# Patient Record
Sex: MALE | Race: WHITE
[De-identification: names, ages, dates, MRNs, and addresses within clinical notes are randomized per-mention and may not be internally consistent; named-entity substitution may affect disease eponyms.]

---

## 2018-12-29 ENCOUNTER — HOSPITAL ENCOUNTER (EMERGENCY)
Dept: HOSPITAL 72 - EMR | Age: 23
Discharge: ANOTHER HOSPITAL | End: 2018-12-29
Payer: MEDICAID

## 2018-12-29 VITALS — SYSTOLIC BLOOD PRESSURE: 115 MMHG | DIASTOLIC BLOOD PRESSURE: 82 MMHG

## 2018-12-29 VITALS — WEIGHT: 175 LBS | HEIGHT: 78 IN | BODY MASS INDEX: 20.25 KG/M2

## 2018-12-29 VITALS — DIASTOLIC BLOOD PRESSURE: 70 MMHG | SYSTOLIC BLOOD PRESSURE: 112 MMHG

## 2018-12-29 DIAGNOSIS — R51: ICD-10-CM

## 2018-12-29 DIAGNOSIS — R44.0: Primary | ICD-10-CM

## 2018-12-29 DIAGNOSIS — R45.851: ICD-10-CM

## 2018-12-29 LAB
ADD MANUAL DIFF: NO
ALBUMIN SERPL-MCNC: 4.5 G/DL (ref 3.4–5)
ALBUMIN/GLOB SERPL: 1.4 {RATIO} (ref 1–2.7)
ALP SERPL-CCNC: 95 U/L (ref 46–116)
ALT SERPL-CCNC: 52 U/L (ref 12–78)
ANION GAP SERPL CALC-SCNC: 10 MMOL/L (ref 5–15)
APPEARANCE UR: CLEAR
APTT PPP: YELLOW S
AST SERPL-CCNC: 16 U/L (ref 15–37)
BASOPHILS NFR BLD AUTO: 1.6 % (ref 0–2)
BILIRUB SERPL-MCNC: 0.7 MG/DL (ref 0.2–1)
BUN SERPL-MCNC: 9 MG/DL (ref 7–18)
CALCIUM SERPL-MCNC: 9.4 MG/DL (ref 8.5–10.1)
CHLORIDE SERPL-SCNC: 104 MMOL/L (ref 98–107)
CK SERPL-CCNC: 56 U/L (ref 26–308)
CO2 SERPL-SCNC: 27 MMOL/L (ref 21–32)
CREAT SERPL-MCNC: 0.9 MG/DL (ref 0.55–1.3)
EOSINOPHIL NFR BLD AUTO: 0.7 % (ref 0–3)
ERYTHROCYTE [DISTWIDTH] IN BLOOD BY AUTOMATED COUNT: 10.4 % (ref 11.6–14.8)
GLOBULIN SER-MCNC: 3.2 G/DL
GLUCOSE UR STRIP-MCNC: NEGATIVE MG/DL
HCT VFR BLD CALC: 45.6 % (ref 42–52)
HGB BLD-MCNC: 15.6 G/DL (ref 14.2–18)
KETONES UR QL STRIP: NEGATIVE
LEUKOCYTE ESTERASE UR QL STRIP: (no result)
LYMPHOCYTES NFR BLD AUTO: 19.3 % (ref 20–45)
MCV RBC AUTO: 88 FL (ref 80–99)
MONOCYTES NFR BLD AUTO: 6.8 % (ref 1–10)
NEUTROPHILS NFR BLD AUTO: 71.7 % (ref 45–75)
NITRITE UR QL STRIP: NEGATIVE
PH UR STRIP: 6 [PH] (ref 4.5–8)
PLATELET # BLD: 177 K/UL (ref 150–450)
POTASSIUM SERPL-SCNC: 3.8 MMOL/L (ref 3.5–5.1)
PROT UR QL STRIP: (no result)
RBC # BLD AUTO: 5.16 M/UL (ref 4.7–6.1)
SODIUM SERPL-SCNC: 141 MMOL/L (ref 136–145)
SP GR UR STRIP: 1.01 (ref 1–1.03)
UROBILINOGEN UR-MCNC: 4 MG/DL (ref 0–1)
WBC # BLD AUTO: 6 K/UL (ref 4.8–10.8)

## 2018-12-29 PROCEDURE — 81003 URINALYSIS AUTO W/O SCOPE: CPT

## 2018-12-29 PROCEDURE — 82550 ASSAY OF CK (CPK): CPT

## 2018-12-29 PROCEDURE — 96375 TX/PRO/DX INJ NEW DRUG ADDON: CPT

## 2018-12-29 PROCEDURE — 84443 ASSAY THYROID STIM HORMONE: CPT

## 2018-12-29 PROCEDURE — 70450 CT HEAD/BRAIN W/O DYE: CPT

## 2018-12-29 PROCEDURE — 85651 RBC SED RATE NONAUTOMATED: CPT

## 2018-12-29 PROCEDURE — 80307 DRUG TEST PRSMV CHEM ANLYZR: CPT

## 2018-12-29 PROCEDURE — 85025 COMPLETE CBC W/AUTO DIFF WBC: CPT

## 2018-12-29 PROCEDURE — 96374 THER/PROPH/DIAG INJ IV PUSH: CPT

## 2018-12-29 PROCEDURE — 80329 ANALGESICS NON-OPIOID 1 OR 2: CPT

## 2018-12-29 PROCEDURE — 36415 COLL VENOUS BLD VENIPUNCTURE: CPT

## 2018-12-29 PROCEDURE — 99285 EMERGENCY DEPT VISIT HI MDM: CPT

## 2018-12-29 PROCEDURE — 80053 COMPREHEN METABOLIC PANEL: CPT

## 2018-12-29 RX ADMIN — SODIUM CHLORIDE SCH MLS/HR: 900 INJECTION, SOLUTION INTRAVENOUS at 19:10

## 2018-12-29 RX ADMIN — SODIUM CHLORIDE SCH MLS/HR: 900 INJECTION, SOLUTION INTRAVENOUS at 15:44

## 2018-12-29 NOTE — EMERGENCY ROOM REPORT
History of Present Illness


General


Chief Complaint:  Behavioral Complaint


Source:  Patient


 (Adalberto Blue MD)





Present Illness


HPI


Patient presents with posterior headache for 3-5 days.  He feels it from his 

head radiate down into his neck.  He denies any fevers or change in vision 

nausea vomiting diarrhea.  He's not taking any medication for the headache.  He 

states the pain is making him feel suicidal at this time.  His plan is to take 

an overdose.  He doesn't have access to medication at this time.  He's never 

been admitted medically.  He's been admitted 3 times psychiatrically in [facility].  Arrested once after discharge from there.  Recently states hearing 

voices.  Some question of whether schizophrenia.  Multiple meds in the past 

with some adverse reactions to some antipsychotics. 





Mom is been trying to get him evaluated for over a year no CT scan done of his 

head.  She has consulted with various people that this suggests that this may 

be encephalitis as opposed to schizophrenia.


 (Adalberto Blue MD)


Allergies:  


Coded Allergies:  


     No Known Allergies (Unverified , 12/29/18)





Patient History


Past Medical History:  see triage record


Social History:  Denies: smoking, alcohol use, drug use


Social History Narrative


recently moved from [place] with Mom


Reviewed Nursing Documentation:  PMH: Agreed; PSxH: Agreed (Adalberto Blue MD)





Nursing Documentation-PMH


Past Medical History:  No History, Except For


History Of Psychiatric Problem:  Yes


 (Adalberto Blue MD)





Physical Exam





Vital Signs








  Date Time  Temp Pulse Resp B/P (MAP) Pulse Ox O2 Delivery O2 Flow Rate FiO2


 


12/29/18 11:21 98.4 84 20 117/76 98 Room Air  








Sp02 EP Interpretation:  reviewed, normal


General Appearance:  well appearing, no apparent distress, GCS 15


Head:  normocephalic


Eyes:  bilateral eye normal inspection


ENT:  moist mucus membranes


Neck:  full range of motion, supple, no meningismus, no bony tend


Respiratory:  chest non-tender, lungs clear, normal breath sounds


Cardiovascular #1:  regular rate, rhythm


Cardiovascular #2:  2+ radial (R)


Gastrointestinal:  normal inspection, normal bowel sounds, non tender, no mass, 

non-distended


Musculoskeletal:  back normal, gait/station normal, normal range of motion


Neurologic:  alert, oriented x3, CNs III-XII nml as tested, motor strength/tone 

normal, DTRs symmetric, sensory intact, cerebellar normal, normal gait, speech 

normal


Psychiatric:  depressed affect


Suicide Risk Assessment:  


   Suicidal Ideation:  Yes


   Had intent to initiate attempt:  Yes


   Pt's plan for suicide attempt:  Yes


   Has means to complete attempt:  No


Reflexes:  2+ knee (R), 2+ knee (L)


Skin:  normal inspection, warm/dry


 (Adalberto Blue MD)





Medical Decision Making


Diagnostic Impression:  


 Primary Impression:  


 Suicidal ideation


 Additional Impressions:  


 Auditory hallucinations


 Headache


 Qualified Codes:  R51 - Headache


ER Course


Patient presents with suicidal ideation and plan but no means to completed at 

this time.  His history of possible schizophrenia in the past.  Differential 

includes exacerbation of schizophrenia, suicidal ideation, major depression, 

schizoaffective disorder amongst others.  Patient is afebrile now in the 

possibility of encephalitis is extremely low.  The fact he has headache he will 

be evaluated with CT of the head and labs. He'll be treated with Toradol, 

Reglan and Benadryl.  Also receive IV hydration.





CT negative.  Labs unremarkable.





The patient's headache is resolved.  Patient still feels suicidal.  Working to 

place the patient is a voluntary  suicidal ideation patient.





Medically clear @ 13:44





Signed out to Dr. Santo.





Laboratory Tests








Test


  12/29/18


12:08


 


White Blood Count


  6.0 K/UL


(4.8-10.8)


 


Red Blood Count


  5.16 M/UL


(4.70-6.10)


 


Hemoglobin


  15.6 G/DL


(14.2-18.0)


 


Hematocrit


  45.6 %


(42.0-52.0)


 


Mean Corpuscular Volume 88 FL (80-99)  


 


Mean Corpuscular Hemoglobin


  30.1 PG


(27.0-31.0)


 


Mean Corpuscular Hemoglobin


Concent 34.1 G/DL


(32.0-36.0)


 


Red Cell Distribution Width


  10.4 %


(11.6-14.8)  L


 


Platelet Count


  177 K/UL


(150-450)


 


Mean Platelet Volume


  7.3 FL


(6.5-10.1)


 


Neutrophils (%) (Auto)


  71.7 %


(45.0-75.0)


 


Lymphocytes (%) (Auto)


  19.3 %


(20.0-45.0)  L


 


Monocytes (%) (Auto)


  6.8 %


(1.0-10.0)


 


Eosinophils (%) (Auto)


  0.7 %


(0.0-3.0)


 


Basophils (%) (Auto)


  1.6 %


(0.0-2.0)


 


Erythrocyte Sedimentation Rate Pending  


 


Urine Color Yellow  


 


Urine Appearance Clear  


 


Urine pH 6 (4.5-8.0)  


 


Urine Specific Gravity


  1.015


(1.005-1.035)


 


Urine Protein


  1+ (NEGATIVE)


H


 


Urine Glucose (UA)


  Negative


(NEGATIVE)


 


Urine Ketones


  Negative


(NEGATIVE)


 


Urine Blood


  Negative


(NEGATIVE)


 


Urine Nitrite


  Negative


(NEGATIVE)


 


Urine Bilirubin


  Negative


(NEGATIVE)


 


Urine Urobilinogen


  4 MG/DL


(0.0-1.0)  H


 


Urine Leukocyte Esterase


  1+ (NEGATIVE)


H


 


Urine RBC


  0-2 /HPF (0 -


0)  H


 


Urine WBC


  2-4 /HPF (0 -


0)


 


Urine Squamous Epithelial


Cells Occasional


/LPF


 


Urine Calcium Oxalate Crystals


  Moderate /LPF


(NONE)


 


Urine Bacteria


  Few /HPF


(NONE)


 


Sodium Level


  141 MMOL/L


(136-145)


 


Potassium Level


  3.8 MMOL/L


(3.5-5.1)


 


Chloride Level


  104 MMOL/L


()


 


Carbon Dioxide Level


  27 MMOL/L


(21-32)


 


Anion Gap


  10 mmol/L


(5-15)


 


Blood Urea Nitrogen


  9 mg/dL (7-18)


 


 


Creatinine


  0.9 MG/DL


(0.55-1.30)


 


Estimate Glomerular


Filtration Rate > 60 mL/min


(>60)


 


Glucose Level


  107 MG/DL


()  H


 


Calcium Level


  9.4 MG/DL


(8.5-10.1)


 


Total Bilirubin


  0.7 MG/DL


(0.2-1.0)


 


Aspartate Amino Transferase


(AST) 16 U/L (15-37)


 


 


Alanine Aminotransferase (ALT)


  52 U/L (12-78)


 


 


Alkaline Phosphatase


  95 U/L


()


 


Total Creatine Kinase


  56 U/L


()


 


Total Protein


  7.7 G/DL


(6.4-8.2)


 


Albumin


  4.5 G/DL


(3.4-5.0)


 


Globulin 3.2 g/dL  


 


Albumin/Globulin Ratio 1.4 (1.0-2.7)  


 


Thyroid Stimulating Hormone


(TSH) 2.121 uiU/mL


(0.358-3.740)


 


Salicylates Level


  0.8 ug/mL


(2.8-20)  L


 


Urine Opiates Screen


  Negative


(NEGATIVE)


 


Acetaminophen Level


  < 2 MCG/ML


(10-30)  L


 


Urine Barbiturates Screen


  Negative


(NEGATIVE)


 


Phencyclidine (PCP) Screen


  Negative


(NEGATIVE)


 


Urine Amphetamines Screen


  Negative


(NEGATIVE)


 


Urine Benzodiazepines Screen


  Negative


(NEGATIVE)


 


Urine Cocaine Screen


  Negative


(NEGATIVE)


 


Urine Marijuana (THC) Screen


  Negative


(NEGATIVE)


 


Serum Alcohol < 3 mg/dL  








 (Adalberto Blue MD)


ER Course


to clarify, patient states his plan was to overdose on his medications.





patient cleared for voluntary psychiatric placement.  





[facility]


 (Ky Santo MD)





CT/MRI/US Diagnostic Results


CT/MRI/US Diagnostic Results :  


   Imaging Test Ordered:  head


   Impression


no lesions


 (Adalberto Blue MD)





Last Vital Signs








  Date Time  Temp Pulse Resp B/P (MAP) Pulse Ox O2 Delivery O2 Flow Rate FiO2


 


12/29/18 19:39 97.8 80 18 115/82 99 Room Air  








Status:  improved


 (Adalberto Blue MD)


Status:  improved


 (Ky Santo MD)


Disposition:  XFER TO PSYCH HOSP/UNIT


Condition:  Serious


Referrals:  


NON PHYSICIAN (PCP)











Adalberto Blue MD Dec 29, 2018 13:43


Ky Santo MD Dec 29, 2018 16:09

## 2018-12-29 NOTE — NUR
ED Nurse Note:





Report given to [facility]. 

Report was given to Deniz JULIEN at [facility] around 1730. 

IV removed without complication. patient's belonging list checked, patient only 
has clothings- 1 sweat pants, 1 black sweat shirt, 2 t shirts, 1 pair of brown 
shoes, patient took all of his belonings with him. 

a/o x4, ambulatory, transferred out of ed via ambulance [place].

## 2018-12-29 NOTE — NUR
ED Nurse Note:



patient brought in by mother, c/o "feeling dark," feels like hurting himself, 
no specific plan. patient is hearing dark voices. a/o x4, mother at bedside. 
ambulatory.

## 2019-08-13 ENCOUNTER — HOSPITAL ENCOUNTER (INPATIENT)
Dept: HOSPITAL 72 - EMR | Age: 24
LOS: 2 days | Discharge: HOME | DRG: 815 | End: 2019-08-15
Payer: COMMERCIAL

## 2019-08-13 VITALS — SYSTOLIC BLOOD PRESSURE: 144 MMHG | DIASTOLIC BLOOD PRESSURE: 90 MMHG

## 2019-08-13 VITALS — DIASTOLIC BLOOD PRESSURE: 85 MMHG | SYSTOLIC BLOOD PRESSURE: 129 MMHG

## 2019-08-13 VITALS — WEIGHT: 190.19 LBS | HEIGHT: 78 IN | BODY MASS INDEX: 22.01 KG/M2

## 2019-08-13 VITALS — DIASTOLIC BLOOD PRESSURE: 74 MMHG | SYSTOLIC BLOOD PRESSURE: 121 MMHG

## 2019-08-13 VITALS — DIASTOLIC BLOOD PRESSURE: 62 MMHG | SYSTOLIC BLOOD PRESSURE: 121 MMHG

## 2019-08-13 VITALS — SYSTOLIC BLOOD PRESSURE: 126 MMHG | DIASTOLIC BLOOD PRESSURE: 71 MMHG

## 2019-08-13 VITALS — SYSTOLIC BLOOD PRESSURE: 123 MMHG | DIASTOLIC BLOOD PRESSURE: 67 MMHG

## 2019-08-13 DIAGNOSIS — B94.8: ICD-10-CM

## 2019-08-13 DIAGNOSIS — F23: ICD-10-CM

## 2019-08-13 DIAGNOSIS — F41.9: ICD-10-CM

## 2019-08-13 DIAGNOSIS — M35.9: ICD-10-CM

## 2019-08-13 DIAGNOSIS — R41.0: ICD-10-CM

## 2019-08-13 DIAGNOSIS — D89.89: Primary | ICD-10-CM

## 2019-08-13 DIAGNOSIS — F32.9: ICD-10-CM

## 2019-08-13 DIAGNOSIS — R45.851: ICD-10-CM

## 2019-08-13 DIAGNOSIS — R44.0: ICD-10-CM

## 2019-08-13 LAB
ADD MANUAL DIFF: NO
ALBUMIN SERPL-MCNC: 4.4 G/DL (ref 3.4–5)
ALBUMIN/GLOB SERPL: 1.2 {RATIO} (ref 1–2.7)
ALP SERPL-CCNC: 77 U/L (ref 46–116)
ALT SERPL-CCNC: 29 U/L (ref 12–78)
ANION GAP SERPL CALC-SCNC: 10 MMOL/L (ref 5–15)
APPEARANCE UR: CLEAR
APTT PPP: (no result) S
AST SERPL-CCNC: 27 U/L (ref 15–37)
BASOPHILS NFR BLD AUTO: 1.6 % (ref 0–2)
BILIRUB SERPL-MCNC: 0.6 MG/DL (ref 0.2–1)
BUN SERPL-MCNC: 13 MG/DL (ref 7–18)
CALCIUM SERPL-MCNC: 9.3 MG/DL (ref 8.5–10.1)
CHLORIDE SERPL-SCNC: 107 MMOL/L (ref 98–107)
CK SERPL-CCNC: 123 U/L (ref 26–308)
CO2 SERPL-SCNC: 26 MMOL/L (ref 21–32)
CREAT SERPL-MCNC: 0.9 MG/DL (ref 0.55–1.3)
EOSINOPHIL NFR BLD AUTO: 2.3 % (ref 0–3)
ERYTHROCYTE [DISTWIDTH] IN BLOOD BY AUTOMATED COUNT: 12.2 % (ref 11.6–14.8)
GLOBULIN SER-MCNC: 3.8 G/DL
GLUCOSE UR STRIP-MCNC: NEGATIVE MG/DL
HCT VFR BLD CALC: 48.4 % (ref 42–52)
HGB BLD-MCNC: 16.3 G/DL (ref 14.2–18)
KETONES UR QL STRIP: NEGATIVE
LEUKOCYTE ESTERASE UR QL STRIP: NEGATIVE
LYMPHOCYTES NFR BLD AUTO: 38.9 % (ref 20–45)
MCV RBC AUTO: 90 FL (ref 80–99)
MONOCYTES NFR BLD AUTO: 12.8 % (ref 1–10)
NEUTROPHILS NFR BLD AUTO: 44.4 % (ref 45–75)
NITRITE UR QL STRIP: NEGATIVE
PH UR STRIP: 6 [PH] (ref 4.5–8)
PLATELET # BLD: 240 K/UL (ref 150–450)
POTASSIUM SERPL-SCNC: 3.6 MMOL/L (ref 3.5–5.1)
PROT UR QL STRIP: NEGATIVE
RBC # BLD AUTO: 5.39 M/UL (ref 4.7–6.1)
SODIUM SERPL-SCNC: 142 MMOL/L (ref 136–145)
SP GR UR STRIP: 1.01 (ref 1–1.03)
UROBILINOGEN UR-MCNC: NORMAL MG/DL (ref 0–1)
WBC # BLD AUTO: 8.5 K/UL (ref 4.8–10.8)

## 2019-08-13 PROCEDURE — 93005 ELECTROCARDIOGRAM TRACING: CPT

## 2019-08-13 PROCEDURE — 80329 ANALGESICS NON-OPIOID 1 OR 2: CPT

## 2019-08-13 PROCEDURE — 84484 ASSAY OF TROPONIN QUANT: CPT

## 2019-08-13 PROCEDURE — 82550 ASSAY OF CK (CPK): CPT

## 2019-08-13 PROCEDURE — 81003 URINALYSIS AUTO W/O SCOPE: CPT

## 2019-08-13 PROCEDURE — 80307 DRUG TEST PRSMV CHEM ANLYZR: CPT

## 2019-08-13 PROCEDURE — 96375 TX/PRO/DX INJ NEW DRUG ADDON: CPT

## 2019-08-13 PROCEDURE — 99285 EMERGENCY DEPT VISIT HI MDM: CPT

## 2019-08-13 PROCEDURE — 36415 COLL VENOUS BLD VENIPUNCTURE: CPT

## 2019-08-13 PROCEDURE — 80053 COMPREHEN METABOLIC PANEL: CPT

## 2019-08-13 PROCEDURE — 85025 COMPLETE CBC W/AUTO DIFF WBC: CPT

## 2019-08-13 PROCEDURE — 96374 THER/PROPH/DIAG INJ IV PUSH: CPT

## 2019-08-13 RX ADMIN — ZIPRASIDONE HYDROCHLORIDE SCH MG: 20 CAPSULE ORAL at 20:38

## 2019-08-13 NOTE — NUR
ED Nurse Note:



Pt came in from home with mother due to being anxious and paranoid. Mother 
stated pt has hx of PANDAS and Lyme disease which caused the behavior of "being 
paranoid and having horrible thoughts" but no SI/HI at this point. No complaint 
of pain. Tachycardic upon arrival, ERMD aware. Belongings are in locker #2. 
Will cont to monitor.

## 2019-08-13 NOTE — NUR
ED Nurse Note:



Flory from [facility] called and said pt will need to be 
evaluated by psychiatrist here at [facility]. CN made aware and pt will be admitted to 
[facility].

## 2019-08-13 NOTE — EMERGENCY ROOM REPORT
History of Present Illness


General


Chief Complaint:  Behavioral Complaint


Source:  Patient, Family Member





Present Illness


HPI


Patient presents stating that he is going to kill himself.  He feels out of 

control.  He tried taking Klonopin before coming in.  He is been hospitalized 

in the past for suicidal ideation and intent.  He was also drinking alcohol 

last night.  Denies other drug use at this time.  He is saying "I am afraid I 

will hurt other people and feel that I want to kill myself."  He is not able to 

vocalize a plan at this time.





Mom claims that these episodes are becoming more frequent in the last 2 to 3 

weeks.





According to his mom he has supplement deficiency, PANDA and Lyme disease.  

Also she claims that he has an overactive thymus gland.  Apparently he is due 

to get plasma exchange or IVIG soon.





Mom alleges that an EEG was performed that was abnormal but she is not sure 

what the results were.





No fevers, chills, sore throat, chest pain, palpitations, nausea, vomiting, 

diarrhea, dysuria, abdominal pain, shortness of breath, joint pain, rashes, 

visual changes, headache.





The patient was evaluated December 29 with this history:


Patient presents with posterior headache for 3-5 days.  He feels it from his 

head radiate down into his neck.  He denies any fevers or change in vision 

nausea vomiting diarrhea.  He's not taking any medication for the headache.  He 

states the pain is making him feel suicidal at this time.  His plan is to take 

an overdose.  He doesn't have access to medication at this time.  He's never 

been admitted medically.  He's been admitted 3 times psychiatrically in [facility].  Arrested once after discharge from there.  Recently states hearing 

voices.  Some question of whether schizophrenia.  Multiple meds in the past 

with some adverse reactions to some antipsychotics. 





Mom is been trying to get him evaluated for over a year no CT scan done of his 

head.  She has consulted with various people that this suggests that this may 

be encephalitis as opposed to schizophrenia.





He underwent voluntary psychiatric admission at that time.


Allergies:  


Coded Allergies:  


     No Known Allergies (Unverified , 12/29/18)





Patient History


Past Medical History:  see triage record


Social History:  Reports: smoking, alcohol use; Denies: drug use - in past


Social History Narrative


Lives with mom


Reviewed Nursing Documentation:  PMH: Agreed; PSxH: Agreed





Nursing Documentation-PMH


Past Medical History:  No History, Except For


Hx Neurological Problems:  Yes





Review of Systems


All Other Systems:  negative except mentioned in HPI





Physical Exam





Vital Signs








  Date Time  Temp Pulse Resp B/P (MAP) Pulse Ox O2 Delivery O2 Flow Rate FiO2


 


8/13/19 11:30 97.0 129 24 132/115 (121) 99 Room Air  








Sp02 EP Interpretation:  reviewed, normal


General Appearance:  alert, non-toxic, moderate distress


Head:  normocephalic, atraumatic


Eyes:  bilateral eye normal inspection, bilateral eye PERRL, bilateral eye EOMI


ENT:  moist mucus membranes


Neck:  supple


Respiratory:  lungs clear, normal breath sounds


Cardiovascular #1:  tachycardia


Cardiovascular #2:  2+ radial (L)


Gastrointestinal:  non tender, soft, decreased bowel sounds


Genitourinary:  no CVA tenderness


Musculoskeletal:  gait/station normal


Neurologic:  CNs III-XII nml as tested, motor strength/tone normal, DTRs 

symmetric, sensory intact, cerebellar normal, normal gait, speech normal, 

oriented - X2


Psychiatric:  anxious, other


Suicide Risk Assessment:  


   Suicidal Ideation:  Yes


   Had intent to initiate attempt:  Yes


Reflexes:  2+ knee (R), 2+ knee (L)


Skin:  no rash





Medical Decision Making


Medical:  Other


Behavioral:  Other


Reaction to Intervention:  No change


Restraint Reassesment


I, Adalberto Blue MD, have personally evaluated this patient. Laboratory tests 

have been reviewed and addressed accordingly.  The patient is deemed to present 

a danger to themselves and/or others.  This is based on the exam, history (

provided by patient) and observed or reported behavior.  


Attempts for non-invasive measures have been considered and/or attempted, 

however, have been futile. It is in the best interest of the nursing staff, the 

patient, and others involved in this patient's care that behavioral restraints 

be applied. 





Patient evaluation reveals the following:  patient states he cannot control his 

actions and is afraid he will harm himself or staff.


Diagnostic Impression:  


 Primary Impression:  


 Delirium


 Additional Impressions:  


 Suicidal ideation


 Autoimmune syndrome


 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


 Acute psychosis


ER Course


Patient presents with suicidal ideation and agitation.  Differential includes 

exacerbation of underlying psychiatric condition, electrolyte imbalance, toxic 

ingestion, partial complex seizures amongst others.  He is responding somewhat 

to coming influence his buddies states that he feels out of control needs to be 

restrained until were able to help him calm down.  Behavioral restraints have 

been ordered.  Patient will be evaluated with EKG chest x-ray and labs.  The 

patient will be given Benadryl and Ativan for sedation initially.  He will need 

psychiatric evaluation and most likely psychiatric admission.





EKG normal sinus rhythm with nonspecific ST-T wave changes rate 94.





More calm after IV meds.  D/C restraints.





Labs unremarkable.





Discussed with  who states the patient is due for plasma exchange or IV 

immunoglobulin with .  He wants the patient admitted to [facility] if at 

all possible.  Alternatively he requests that the patient be admitted to Dr. Alonzo Lawton.





After treatment here patient no longer suicidal or delusional.  Patient needs 

admission to the medical floor for the consideration of possible continued 

psychiatric treatment versus change or IV immunoglobulin treatment.





Consider partial complex seizure activity.





Presented to Dr. Lawton.





Presented to [facility].  





Signed out to Dr. Juarez.  (Patient already admitted here.)





Apparently [facility] declined transfer.





Laboratory Tests








Test


  8/13/19


11:40 8/13/19


12:24


 


White Blood Count


  8.5 K/UL


(4.8-10.8) 


 


 


Red Blood Count


  5.39 M/UL


(4.70-6.10) 


 


 


Hemoglobin


  16.3 G/DL


(14.2-18.0) 


 


 


Hematocrit


  48.4 %


(42.0-52.0) 


 


 


Mean Corpuscular Volume 90 FL (80-99)   


 


Mean Corpuscular Hemoglobin


  30.3 PG


(27.0-31.0) 


 


 


Mean Corpuscular Hemoglobin


Concent 33.7 G/DL


(32.0-36.0) 


 


 


Red Cell Distribution Width


  12.2 %


(11.6-14.8) 


 


 


Platelet Count


  240 K/UL


(150-450) 


 


 


Mean Platelet Volume


  6.7 FL


(6.5-10.1) 


 


 


Neutrophils (%) (Auto)


  44.4 %


(45.0-75.0)  L 


 


 


Lymphocytes (%) (Auto)


  38.9 %


(20.0-45.0) 


 


 


Monocytes (%) (Auto)


  12.8 %


(1.0-10.0)  H 


 


 


Eosinophils (%) (Auto)


  2.3 %


(0.0-3.0) 


 


 


Basophils (%) (Auto)


  1.6 %


(0.0-2.0) 


 


 


Sodium Level


  142 MMOL/L


(136-145) 


 


 


Potassium Level


  3.6 MMOL/L


(3.5-5.1) 


 


 


Chloride Level


  107 MMOL/L


() 


 


 


Carbon Dioxide Level


  26 MMOL/L


(21-32) 


 


 


Anion Gap


  10 mmol/L


(5-15) 


 


 


Blood Urea Nitrogen


  13 mg/dL


(7-18) 


 


 


Creatinine


  0.9 MG/DL


(0.55-1.30) 


 


 


Estimate Glomerular


Filtration Rate > 60 mL/min


(>60) 


 


 


Glucose Level


  90 MG/DL


() 


 


 


Calcium Level


  9.3 MG/DL


(8.5-10.1) 


 


 


Total Bilirubin


  0.6 MG/DL


(0.2-1.0) 


 


 


Aspartate Amino Transferase


(AST) 27 U/L (15-37)


  


 


 


Alanine Aminotransferase (ALT)


  29 U/L (12-78)


  


 


 


Alkaline Phosphatase


  77 U/L


() 


 


 


Total Creatine Kinase


  123 U/L


() 


 


 


Troponin I


  0.000 ng/mL


(0.000-0.056) 


 


 


Total Protein


  8.2 G/DL


(6.4-8.2) 


 


 


Albumin


  4.4 G/DL


(3.4-5.0) 


 


 


Globulin 3.8 g/dL   


 


Albumin/Globulin Ratio 1.2 (1.0-2.7)   


 


Salicylates Level


  0.6 ug/mL


(2.8-20)  L 


 


 


Acetaminophen Level


  < 2 MCG/ML


(10-30)  L 


 


 


Serum Alcohol < 3 mg/dL   


 


Urine Color  Pale yellow  


 


Urine Appearance  Clear  


 


Urine pH  6 (4.5-8.0)  


 


Urine Specific Gravity


  


  1.010


(1.005-1.035)


 


Urine Protein


  


  Negative


(NEGATIVE)


 


Urine Glucose (UA)


  


  Negative


(NEGATIVE)


 


Urine Ketones


  


  Negative


(NEGATIVE)


 


Urine Blood


  


  Negative


(NEGATIVE)


 


Urine Nitrite


  


  Negative


(NEGATIVE)


 


Urine Bilirubin


  


  Negative


(NEGATIVE)


 


Urine Urobilinogen


  


  Normal MG/DL


(0.0-1.0)


 


Urine Leukocyte Esterase


  


  Negative


(NEGATIVE)


 


Urine Opiates Screen


  


  Negative


(NEGATIVE)


 


Urine Barbiturates Screen


  


  Negative


(NEGATIVE)


 


Phencyclidine (PCP) Screen


  


  Negative


(NEGATIVE)


 


Urine Amphetamines Screen


  


  Negative


(NEGATIVE)


 


Urine Benzodiazepines Screen


  


  Negative


(NEGATIVE)


 


Urine Cocaine Screen


  


  Negative


(NEGATIVE)


 


Urine Marijuana (THC) Screen


  


  Negative


(NEGATIVE)








EKG Diagnostic Results


Rate:  normal


Rhythm:  NSR


ST Segments:  no acute changes





Rhythm Strip Diag. Results


EP Interpretation:  yes


Rhythm:  NSR, no PVC's, no ectopy





Last Vital Signs








  Date Time  Temp Pulse Resp B/P (MAP) Pulse Ox O2 Delivery O2 Flow Rate FiO2


 


8/13/19 18:03      Room Air  


 


8/13/19 17:50 98.0 80 17 118/74 100   








Status:  improved


Disposition:  ADMITTED AS INPATIENT


Condition:  Serious











Adalberto Blue MD Aug 13, 2019 11:52

## 2019-08-13 NOTE — NUR
ED Nurse Note:



Ativan 2mg given. barcode could not be scanned due to stiker covered the 
barcode. CN witnessed.

## 2019-08-13 NOTE — HISTORY AND PHYSICAL REPORT
DATE OF ADMISSION:  08/13/2019

CHIEF COMPLAINT AND REASON FOR HOSPITALIZATION:  The patient is admitted

with severe mental status changes.



HISTORY OF PRESENT ILLNESS:  The patient is a 24-year-old  male,

with a history of psychiatric problems and a prior diagnosis of PANDAS,

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

He has apparently had brain imaging in the past that is suggestive of a

autoimmune disease.  He came to the emergency room today with episodes of

depersonalization, hallucinations, sensation of being out of body, and

suicidal ideation.  It was felt by the emergency room physicians he

require acute hospitalization.  He could not control his emotions and

needs to be sedated and possibly restrained.  He apparently has told the

emergency room, steps in the past that he put a belt around his neck to

hang himself in the past.  He has had prior 9 psychiatric hospitalizations

in the last few years.  He has also had a history of apparently Lyme

disease being diagnosed and he states this may have been after a tick bite

at age of 9.  He is concerned that he has severe brain problem and he has

been seen by physicians at [facility] who recommended possible IVIg or

plasmapheresis for the autoimmune component associated with PANDAS.



ALLERGIES:  None known.



MEDICATIONS:  Geodon 60 mg daily; peptide, uncertain dose; Klonopin 0.5 mg

b.i.d.; Ativan 2 mg p.r.n.; zinc over-the-counter 1 daily; multivitamin

daily; doxycycline 400 mg daily; rifampin I believe 300 mg b.i.d.  Note,

the antibiotics apparently are to treat possible Lyme disease or

infectious component of his PANDAS.



HABITS:  He denies smoking, alcohol, or drugs.  His drug screen is

negative.



SOCIAL HISTORY:  He lives with his mother and sister.  His parents are

.  He has moved multiple times in the [facility] over the

last 10 years.  His father is residing in another city in [facility] [place].  He has 2 years of college.



SYSTEM REVIEW:

HEAD, EYES, EARS, NOSE, THROAT:  The patient's hearing is good.

ENDOCRINE:  No diabetes or thyroid disease.

PULMONARY:  No chronic cough, asthma, or TB.

CARDIAC:  No angina, MI, palpitations.

GASTROINTESTINAL:  No GI bleeding or ulcers.

GENITOURINARY:  No dysuria or hematuria.

NEUROLOGIC:  No CVA or seizures.



PHYSICAL EXAMINATION:

GENERAL:  The patient is alert, well-developed man, in no acute distress.

He is seen post sedation.

VITAL SIGNS:  Temperature 98, pulse 80, respirations 17, blood pressure

118/74.

HEAD, EYES, EARS, NOSE, AND THROAT:  Sclerae are nonicteric.  Ocular motion

is intact in all directions.  Oral mucosa moist.

NECK:  No adenopathy or thyroid enlargement.  No nuchal rigidity.

LUNGS:  Clear.

HEART:  Regular rhythm.  No murmur.

ABDOMEN:  Soft without organomegaly or masses.

EXTREMITIES:  No edema, cyanosis, or clubbing.

NEUROLOGIC:  He is alert and oriented with clear speech.  Ocular motions

intact in all directions.  Smile is symmetric.  Tongue is midline.  He

moves all extremities.  Plantar showed no response.  Finger-nose-finger

testing shows no tremor.



IMPRESSION:

1. 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

thoughts and suicidal ideations, decompensated.

2. The patient states he has a history of tremor from medications in the

past, none seen.

3. History of possible Lyme disease.

4. History of abnormal CNS imaging concern for autoimmune disorder or

PANDAS, which may require further medical treatment.



PLAN:  The patient get psychiatric consultation and place him back on

antipsychotic medications.  We will observe for any high-risk behavior.  I

have ordered a sitter.  The patient wishes to pursue the possibility of

IVIg or plasmapheresis at [facility] once he is stabilized.









  ______________________________________________

  Alonzo Lawton M.D. DR:  PABLO

D:  08/13/2019 19:01

T:  08/13/2019 20:13

JOB#:  [id number]

CC:

## 2019-08-13 NOTE — NUR
ED Nurse Note:



ERMD assessed patient at bedside. pt is calm and cooperative. no signs of 
distress or anxiety noted. waiting for the available room.

## 2019-08-13 NOTE — NUR
ED Nurse Note:

PT. WAS ASSESSED BY DR HERNANDEZ AND STATED PT. IS COPPERATIVE, CALM AND NOT 
DELUSIONAL.

## 2019-08-13 NOTE — NUR
ED Nurse Note:



The room is available [place] [facility] [place] per Dr. Blue, we will wait for [facility] 
to call us if they will accept patient.

## 2019-08-13 NOTE — NUR
ED Nurse Note:



pt stated "I feel better. I want my restrains off." pt has no signs of distress 
at this moment. notified ERMD and he assessed pt at the bedside. per ERMD 
behavior restraints discontinued.

## 2019-08-13 NOTE — NUR
NURSE NOTES:

Patient in bed, awake, alert and verbally responsive. Able to make needs known. Respiration 
is even and unlabored. Kept clean and comfortable. No complaint of pain or discomfort noted. 
IV site noted. Skin is intact, warm and dry to touch. No complaint of pain or discomfort 
noted. Call light is at bedside. Will continue plan of care.

## 2019-08-13 NOTE — NUR
NURSE NOTES:

Patient was seen by Dr. Lawton. RN received order for sitter STAT. Charge nurse and house 
supervisor aware. Dr. Lawton also aware that patient does not remember the name of the 
medications.

## 2019-08-13 NOTE — NUR
CASE MANAGEMENT: REVIEW



24Y/MALE BIB MOM FROM HOME 



CC: FEELS LIKE HE WILL BLACK OUT SOON AND BECOME VIOLENT 





SI: DELIRIUM . SUICIDAL IDEATION

T 97.0  RR 20 /115 SAT 99% ROOM AIR

TOX: SALICYLATE 0.6 ACETAMINOPHEN <2





IS: ATIVAN 2MG IV X1

BENADRYL IV X1

BEHAVIORAL RESTRAINTS 





***PATIENT ADMITTED TO MED/SURG UNIT 08/13/2019***

DCP: PATIENT IS FROM HOME

## 2019-08-13 NOTE — NUR
NURSE NOTES:

Received patient from ER via gurney. Patient is alert and oriented x3-4 but does not 
remember the name of medications he takes and the dosage, he says "My mom knows." Will 
follow up. Orientation given to the patient about the unit, meal time, plan of care, call 
light use, etc. IV intact, no s/s of infiltration. Patient is calm,denies delusional 
thinking or paranoid @ this time. Patient stated that he has no visual or auditory 
hallucination but his brain falls apart and he has weird thought and sometimes he can not 
control himself. Patient was admitted to psych unit recently. Denies suicidal ideation @ 
this time but in the past per patient. All belongings were checked by RN and patient. No 
cell phone, no cash or credit cards. Bed is in lowest position and locked. Dr. Lawton was 
paged. Will continue plan of care and provided safe environment.

## 2019-08-14 VITALS — DIASTOLIC BLOOD PRESSURE: 78 MMHG | SYSTOLIC BLOOD PRESSURE: 109 MMHG

## 2019-08-14 VITALS — DIASTOLIC BLOOD PRESSURE: 69 MMHG | SYSTOLIC BLOOD PRESSURE: 107 MMHG

## 2019-08-14 VITALS — SYSTOLIC BLOOD PRESSURE: 125 MMHG | DIASTOLIC BLOOD PRESSURE: 87 MMHG

## 2019-08-14 VITALS — SYSTOLIC BLOOD PRESSURE: 142 MMHG | DIASTOLIC BLOOD PRESSURE: 78 MMHG

## 2019-08-14 VITALS — DIASTOLIC BLOOD PRESSURE: 72 MMHG | SYSTOLIC BLOOD PRESSURE: 130 MMHG

## 2019-08-14 VITALS — DIASTOLIC BLOOD PRESSURE: 65 MMHG | SYSTOLIC BLOOD PRESSURE: 118 MMHG

## 2019-08-14 VITALS — DIASTOLIC BLOOD PRESSURE: 74 MMHG | SYSTOLIC BLOOD PRESSURE: 122 MMHG

## 2019-08-14 RX ADMIN — ZIPRASIDONE HYDROCHLORIDE SCH MG: 20 CAPSULE ORAL at 21:42

## 2019-08-14 RX ADMIN — ZIPRASIDONE HYDROCHLORIDE SCH MG: 20 CAPSULE ORAL at 10:16

## 2019-08-14 NOTE — NUR
NURSE NOTES:Patient  received in bed sleeping comfortably. safety/fall precautions 1:1 
sitter at bedside ( carla Cardenas LVLN )Call light within reach . bed in low position at 
all times will continue to monitor

## 2019-08-14 NOTE — NUR
25 Y/O Male from Home



CC: brought in by mom, pt feels like he will black out soon and become violent, pt pacing in 
waiting room, pt states can you find out whats wrong with my brain? 



SI: Delerium 

     VS: BP: 132/115 HR: 129 RR 24 02 Sat 99% (RA) T: 97.0

     NT: Monocyte% 12.8  







IS: Benedryl 50mg IVP

     Ativan 2mg IV







*****Admitted to MedSurg*****

*****MedSurg status*****



DCP: Pending Hospital Stay

## 2019-08-14 NOTE — NUR
NURSE NOTES:

Patient was seen by Dr. Lawton. Dr. Lawton said he talked to Dr. Wu on the phone yesterday and 
Dr. Lawton also checked the medication list.

## 2019-08-14 NOTE — NUR
NURSE NOTES:

Patient is calm and had breakfast. Denies any pain or discomfort. No delusional thinking. No 
hallucination.Will continue to monitor.

## 2019-08-14 NOTE — NUR
NURSE NOTES:

Family visited him and patient's mom wants Dr. Chicho Wu patient's Hematologist to see the 
patient. Phone number [phone]. Rn will follow up with Dr. Lawton.

## 2019-08-14 NOTE — NUR
NURSE NOTES:

NURSE NOTES:

Received patient in bed, asleep @ this time. Breathing is even and unlabored. sitter @ 
bedside, Bryant. Bed is in lowest position and locked. Provided a safe and hazard free 
environment.Close monitoring @ all times. Will continue plan of care.

## 2019-08-14 NOTE — NUR
NURSE NOTES:

Patient was seen by Dr. Winkler. Dr. Winkler discontinued the sitter. RN informed Dr. Winkler of other nursing order from Dr. Lawton saying sitter @ all times.She is aware and 
discontinued the sitter.Will continue to monitor.

## 2019-08-14 NOTE — CONSULTATION
DATE OF CONSULTATION:  08/14/2019

CONSULTING PHYSICIAN:  Kimani Winkler M.D.



HISTORY OF PRESENT ILLNESS:  The patient is a 24-year-old,  male

with a history of anxiety disorder, PANDAS, Lyme disease, possible

schizophrenia who has been admitted to the hospital for medical

stabilization.  The patient has severe anxiety.  The patient was asking

for more Ativan and Klonopin.  The patient has had episodes of

hallucinations, depersonalization, derealization.  The patient denied any

suicidal or homicidal ideation.  Does not endorse any psychotic or manic

symptoms.  No depressive symptoms.  The patient has fatigue.  The patient

has no suicidal or homicidal ideation.  In the ER, the patient was

severely agitated, was sedated in the emergency room.  Today, he is in

better mood.  He was able to answer the questions.  Spoke about the

history of Lyme that he has had since age 12 when he was living in

 Also, he spoke about PANDAS and stated that he does not want to

try any SSRI or any other medication beside Ativan or Klonopin.



PAST PSYCHIATRIC HISTORY:  Psychotic disorder, anxiety, depression.  He was

apparently diagnosed with schizophrenia.



PAST MEDICAL HISTORY:  Lyme disease, PANDAS.



ALLERGIES:  No known drug allergies.



SUBSTANCE ABUSE HISTORY:  He denies any illicit drug use or alcohol.

Toxicology is negative for any drugs.



MENTAL STATUS EXAMINATION:  The patient is alert, oriented times self,

place, situation, and date.  Pleasant, cooperative.  Mood was anxious.

Affect is constricted, congruent with mood.  Thought process is linear.

Thought content, no suicidal or homicidal ideations.



ASSESSMENT:

Axis I  Anxiety disorder.

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

Axis II  Deferred.

Axis III  PANDAS.

Lyme disease.

Axis IV  Low.

Axis V  60.



PLAN:

1. The patient's lorazepam will be changed to oral 1 mg every 6 hours

p.r.n.

2. The patient will be continued on ziprasidone 2 mg.

3. Continue the clonazepam 1 mg every 8 hours.  We will provide the

patient with reality orientation and supportive therapy.

4. The patient is not an imminent danger to self or others.









  ______________________________________________

  Kimani Winkler M.D.





DR:  PINKY

D:  08/14/2019 20:01

T:  08/14/2019 21:47

JOB#:  [id number]

CC:



CRISTIANA

## 2019-08-14 NOTE — NUR
NURSE NOTES:

Patient is in bed, awake, he states that " I feel ok, now." Patient is calm.No suicidal 
thought. Will continue to monitor.

## 2019-08-14 NOTE — NUR
Social Work

This SW received notification due to suicidal thoughts, currently has a 1:1 sitter. This Sw 
met with patient who remains alert/oriented x4, independent. Patient denied any 
suicidal/homicidal thoughts, stating he believes he is here due to medical related issues. 
Patient admits to taking Clonipin and Ativan (has a Psychiatrist in [place] with Dr. Howard). Patient lives with his mother and sister and does not work. Pending Psychiatry to 
see patient (pending further recommendations, as needed).

## 2019-08-14 NOTE — GENERAL PROGRESS NOTE
Assessment/Plan


Problem List:  


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 

streptococcal infection (PANDAS)


ICD Codes:  D89.89 - Other specified disorders involving the immune mechanism, 

not elsewhere classified; B94.8 - Sequelae of other specified infectious and 

parasitic diseases


SNOMED:  100871760


(2) Auditory hallucinations


ICD Codes:  R44.0 - Auditory hallucinations


SNOMED:  01312331


(3) Delirium


ICD Codes:  R41.0 - Disorientation, unspecified


SNOMED:  4216242


(4) Suicidal ideation


ICD Codes:  R45.851 - Suicidal ideations


SNOMED:  4952877


(5) Acute psychosis


ICD Codes:  F23 - Brief psychotic disorder


SNOMED:  55803819, 7763396


Assessment/Plan:


await psychiatric clearance, cont geodon, klonopin until further orders





Subjective


Constitutional:  Reports: no symptoms


HEENT:  Reports: no symptoms


Cardiovascular:  Reports: no symptoms


Respiratory:  Reports: no symptoms


Gastrointestinal/Abdominal:  Reports: no symptoms


Genitourinary:  Reports: no symptoms


Neurologic/Psychiatric:  Reports: other - still intrusive thoughts, delusions


Endocrine:  Reports: no symptoms


Hematologic/Lymphatic:  Reports: no symptoms


Allergies:  


Coded Allergies:  


     No Known Allergies (Unverified , 12/29/18)





Objective





Last 24 Hour Vital Signs








  Date Time  Temp Pulse Resp B/P (MAP) Pulse Ox O2 Delivery O2 Flow Rate FiO2


 


8/14/19 12:00 97.8 81 19 130/72 (91) 98   


 


8/14/19 09:00      Room Air  


 


8/14/19 08:00 97.0 100 20 125/87 (100) 98   


 


8/14/19 03:54 97.5 74 18 107/69 (82) 99   


 


8/14/19 00:00 97.1 85 18 109/78 (88) 98   


 


8/13/19 21:00      Room Air  


 


8/13/19 20:00 97.3 77 16 129/85 (100) 97   


 


8/13/19 18:03      Room Air  


 


8/13/19 17:50 98.0 80 17 118/74 100 Room Air  

















Intake and Output  


 


 8/13/19 8/14/19





 19:00 07:00


 


Intake Total 0 ml 320 ml


 


Balance 0 ml 320 ml


 


  


 


Intake Oral 0 ml 320 ml


 


# Voids 1 2








Height (Feet):  6


Height (Inches):  7.00


Weight (Pounds):  190


General Appearance:  WD/WN, no apparent distress


EENT:  PERRL/EOMI


Neck:  non-tender, normal alignment


Cardiovascular:  normal rate


Respiratory/Chest:  lungs clear, normal breath sounds


Abdomen:  non tender


Extremities:  normal range of motion, non-tender


Edema:  no edema noted Arm (L), no edema noted Arm (R), no edema noted Leg (L), 

no edema noted Leg (R), no edema noted Pedal (L), no edema noted Pedal (R), no 

edema noted Generalized


Neurologic:  CNs II-XII grossly normal











Alonzo Lawton MD Aug 14, 2019 17:21

## 2019-08-14 NOTE — NUR
NURSE NOTES:

Patient in bed, awake, alert and verbally responsive. Able to make needs known. Respiration 
is even and unlabored. No complaint of pain or discomfort noted. Skin is intact, warm and 
dry to touch. Abdomen is soft and non distended. iV site noted, intact. Bed in low and 
locked position. Provided safe environment. Call light is at bedside. Will continue plan of 
care.

## 2019-08-15 VITALS — DIASTOLIC BLOOD PRESSURE: 69 MMHG | SYSTOLIC BLOOD PRESSURE: 122 MMHG

## 2019-08-15 VITALS — DIASTOLIC BLOOD PRESSURE: 78 MMHG | SYSTOLIC BLOOD PRESSURE: 133 MMHG

## 2019-08-15 VITALS — SYSTOLIC BLOOD PRESSURE: 123 MMHG | DIASTOLIC BLOOD PRESSURE: 68 MMHG

## 2019-08-15 VITALS — SYSTOLIC BLOOD PRESSURE: 129 MMHG | DIASTOLIC BLOOD PRESSURE: 76 MMHG

## 2019-08-15 RX ADMIN — ZIPRASIDONE HYDROCHLORIDE SCH MG: 20 CAPSULE ORAL at 08:31

## 2019-08-15 NOTE — NUR
CHARGE NURSE NOTE:

Pt's mother is very upset that pt has a discharge order. She states that patient does not 
feel good, needs to be treated for his autoimmune disease, that what makes his condition 
worse.  was called, message left.

## 2019-08-15 NOTE — NUR
NURSE NOTES:

Patient refused clonazepam medication, informed of the risks and benefits, still refused. 
Medication was open, will waste.  

-------------------------------------------------------------------------------

Addendum: 08/15/19 at 0540 by APRIL BEARDEN RN RN

-------------------------------------------------------------------------------

NURSE NOTES:

Patient refused clonazepam medication, informed of the risks and benefits, still refused. 
Medication was open, will waste. WILY Crespo witnessed.

## 2019-08-15 NOTE — NUR
NURSE NOTES:

Patient received in stable condition, sleeping in bed. Breathing unlabored on room air, no 
signs of SOB or pain observed. Patient appeared to be calm. Bed locked in lowest position. 
Bed locked. Call light placed within reach. Will continue to monitor.

## 2019-08-15 NOTE — NUR
*-* INSURANCE *-*



ALL CLINICALS AND REVIEWS HAVE BEEN FAXED TO:



[facility] 

FAX ALL CLINICALS  [phone]

## 2019-08-16 NOTE — DISCHARGE SUMMARY
DATE OF ADMISSION:  08/13/2019



DATE OF DISCHARGE:  08/15/2019



PERTINENT HISTORY:  The patient is a 24-year-old man with recurring

delusional disorder and a prior diagnosis of PANDAS.  He presented to the

emergency room with intrusive thoughts, severe agitation, and suicidal

ideation.  There is a question of prior Lyme disease.  Physical exam was

normal.  By the time I saw him, he was calm with a normal neurologic exam,

but still said he had hallucinations.



COURSE IN THE HOSPITAL:  The patient was started back on his antipsychotic

medications and his Geodon was increased from 60 mg daily to b.i.d.  With

the above treatment, he felt better and was no longer suicidal and was

cleared for discharge by Dr. Winkler.  The patient still had some

intrusive thoughts, but no suicidal ideations.  He will be followed in the

office of Dr. Brandon Wu and his prior psychiatrist.



FINAL DIAGNOSES:

1. Anxiety disorder.

2. PANDAS.

3. History of Lyme disease.

4. History of suicidal ideation.



DISCHARGE DISPOSITION:  Discharge home on Geodon 60 mg b.i.d. and Klonopin

and Ativan p.r.n. and doxycycline 100 mg b.i.d.  Follow up in the office

of Dr. Wu.  Note that, PANDAS sometimes known as pediatric autoimmune

neuropsychiatric disorders associated with streptococcal infection, but

also could be associated with other infections.









  ______________________________________________

  Alonzo Lawton M.D.





DR:  EDVIN

D:  08/15/2019 15:54

T:  08/16/2019 05:08

JOB#:  [id number]

CC:

## 2019-08-16 NOTE — PROGRESS NOTE
DATE:  08/15/2019

SUBJECTIVE:  The patient is doing better, calmer, more cooperative.  No

behavior issues.  Able to answer the questions appropriately.  The patient

is not endorsing any suicidal or homicidal ideations.  The patient has

been eating well, afebrile, calm.  No agitation.



MENTAL STATUS EXAMINATION:  Alert and oriented times self, place, and

situation.  Mood is depressed.  Affect is constricted, congruent with

mood.  Thought process is linear and goal oriented.  Thought content, no

suicidal or homicidal ideation.



ASSESSMENT:  Major depressive disorder.



PLAN:  We will continue the _____.  The patient was reluctant to take the

new medication.  Provide the patient with reality orientation and

supportive therapy.









  ______________________________________________

  Kimani Winkler M.D.





DR:  YEIMY

D:  08/15/2019 23:13

T:  08/16/2019 04:34

JOB#:  [id number]

CC: